# Patient Record
Sex: FEMALE | Race: WHITE | URBAN - METROPOLITAN AREA
[De-identification: names, ages, dates, MRNs, and addresses within clinical notes are randomized per-mention and may not be internally consistent; named-entity substitution may affect disease eponyms.]

---

## 2019-05-08 ENCOUNTER — POST MORTEM DOCUMENTATION (OUTPATIENT)
Dept: TRANSPLANT | Facility: CLINIC | Age: 64
End: 2019-05-08

## 2019-05-08 NOTE — PROGRESS NOTES
Received notification of patient's death from routine follow up.  Place of death was reported as home with hospice.  Graft status at the time of death was reported as Functioning.  Additional information: Patient with multiple co morbidities including left suprahilar mass, left adrenal mass and right rib fracture with high suspicion for malignancy. A renal ultrasound revelaed multiple discrete masses. Pericardial effusion. Melanic stools. Sepsis due to UTI. Head CT revealed multiple ring enhancing lesions, possible metastasis/abscess. Biopsy of suprahilar mass too risky due to location and her instability. Due to poor prognosis, son who is POA requested no additional cares. Discharged home with hospice.  TIS verification is: Complete  The [facility] has been notified that p